# Patient Record
Sex: MALE | URBAN - METROPOLITAN AREA
[De-identification: names, ages, dates, MRNs, and addresses within clinical notes are randomized per-mention and may not be internally consistent; named-entity substitution may affect disease eponyms.]

---

## 2023-03-15 ENCOUNTER — HOSPITAL ENCOUNTER (EMERGENCY)
Age: 43
Discharge: STILL ADMITTED OR ADMINISTRATIVE | End: 2023-03-15
Attending: STUDENT IN AN ORGANIZED HEALTH CARE EDUCATION/TRAINING PROGRAM

## 2023-03-15 NOTE — ED NOTES
Per ems patient was found by bystanders with shallow respirations and suspected possible overdose. Pt was picked up by EMS not cooperative and would not provide them with patient name. Patient was moved over to bed in ER refusing to provide name or sit still for vitals.  Patient eventually provided a name that is believed to be false, patient would not sit still for vitals and patient states he wants help but would not state what he wants help for